# Patient Record
Sex: FEMALE | Race: WHITE | Employment: FULL TIME | URBAN - METROPOLITAN AREA
[De-identification: names, ages, dates, MRNs, and addresses within clinical notes are randomized per-mention and may not be internally consistent; named-entity substitution may affect disease eponyms.]

---

## 2019-06-15 NOTE — ED PROVIDER NOTES
Patient Seen in: [facility] [place]    History   Patient presents with:  Abdomen/Flank Pain (GI/)    Stated Complaint: LOWER ABDOMINAL PAIN X 1 WK    HPI    Patient a 27-year-old female who states last week she has had lower abdominal meningismus. LUNGS: Lungs clear to auscultation bilaterally. CARDIOVASCULAR: + S1-S2, regular rate and rhythm, no murmurs. BACK: No CVA tenderness, no midline bony tenderness. ABDOMEN: + Bowel sounds, soft, mild tenderness suprapubic, nondistended.   No showed no appendicitis. Patient denies feeling constipated. Patient may use stool softener as discussed. Recommend Tylenol ibuprofen as needed. Patient tenderness on pelvic exam will be referred to OB/GYN for further evaluation.   Patient return if righ

## 2019-06-15 NOTE — ED INITIAL ASSESSMENT (HPI)
Pt has had severe abdominal pain and cramping sonia with intercourse. No vomiting, no fever no chills, only 1 bout of diarrhea.

## 2019-06-16 NOTE — ED NOTES
Called to patient, message left to voicemail to call [facility] back.   (this is to notify of final GC/chlamydia results.)

## 2019-06-19 NOTE — ED NOTES
Called the patient's phone number and left message to call back if she has any questions regarding her visit.

## 2019-12-27 NOTE — PROGRESS NOTES
CHIEF COMPLAINT:   Patient presents with:  Sore Throat: sinus congestiion, both ear pain x5days      HPI:   Kirk Triana is a 34year old female who presents for some on-going severe sore throat/URI symptoms.   Reports she has essentially been sick about GENERAL: well developed, well nourished, and in no apparent distress  SKIN: no rashes, no suspicious lesions  HEAD: atraumatic, normocephalic.  +mild tenderness on palpation of maxillary and frontal sinuses.   EYES: conjunctiva clear, EOM intact  EARS: +Jaylan The sinuses are air-filled spaces within the bones of the face. They connect to the inside of the nose. Sinusitis is an inflammation of the tissue that lines the sinuses. Sinusitis can occur during a cold.  It can also happen due to allergies to pollens and · Do not use nasal rinses or irrigation during an acute sinus infection, unless your healthcare provider tells you to. Rinsing may spread the infection to other areas in your sinuses.   · Use acetaminophen or ibuprofen to control pain, unless another pain m Sore throats happen for many reasons, such as colds, allergies, and infections caused by viruses or bacteria. In any case, your throat becomes red and sore.  Your goal for self-care is to reduce your discomfort while giving your throat a chance to heal.  Mo · White spots on the throat  · Great difficulty swallowing  · Trouble breathing  · A skin rash  · Recent exposure to someone else with strep bacteria  · Severe hoarseness and swollen glands in the neck or jaw  Date Last Reviewed: 8/1/2016 © 2000-2019 The

## 2019-12-27 NOTE — PATIENT INSTRUCTIONS
Sinusitis (Antibiotic Treatment)    The sinuses are air-filled spaces within the bones of the face. They connect to the inside of the nose. Sinusitis is an inflammation of the tissue that lines the sinuses. Sinusitis can occur during a cold.  It can also · Do not use nasal rinses or irrigation during an acute sinus infection, unless your healthcare provider tells you to. Rinsing may spread the infection to other areas in your sinuses.   · Use acetaminophen or ibuprofen to control pain, unless another pain m Sore throats happen for many reasons, such as colds, allergies, and infections caused by viruses or bacteria. In any case, your throat becomes red and sore.  Your goal for self-care is to reduce your discomfort while giving your throat a chance to heal.  Mo · White spots on the throat  · Great difficulty swallowing  · Trouble breathing  · A skin rash  · Recent exposure to someone else with strep bacteria  · Severe hoarseness and swollen glands in the neck or jaw  Date Last Reviewed: 8/1/2016 © 2000-2019 The

## 2020-01-23 NOTE — PATIENT INSTRUCTIONS
The Flu (Influenza)     The virus that causes the flu spreads through the air in droplets when someone who has the flu coughs, sneezes, laughs, or talks. The flu (influenza) is an infection that affects your respiratory tract.  This tract is made up of The flu usually gets better after 7 days or so. In some cases, your healthcare provider may prescribe an antiviral medicine. This may help you get well a little sooner.  For the medicine to help, you need to take it as soon as possible (ideally within 48 ho · One of the best ways to prevent the flu is to get a flu vaccine each year. The CDC recommends that all people 10months of age and older get a flu vaccine every year. The virus that causes the flu changes from year to year.  For that reason, healthcare pro · Rub your hands together briskly, cleaning the backs of your hands, the palms, between your fingers, and up the wrists. · Rub until the gel is gone and your hands are completely dry.   Preventing the flu in healthcare settings  The flu is a special concer Symptoms of the flu may be mild or severe. They can include extreme tiredness (wanting to stay in bed all day), chills, fevers, muscle aches, soreness with eye movement, headache, and a dry, hacking cough.   Home care  Follow these guidelines when caring fo · Severe weakness or dizziness  · You get a new fever or cough after getting better for a few days  Date Last Reviewed: 1/1/2017  © 2649-8728 The [employer]. [address], [address] [place] [address]. All rights reserved.  This information is not

## 2020-01-23 NOTE — PROGRESS NOTES
Patient presents with:  Fever: 101.9-100.5 X 1-2 days  Flu: Chills, bodyaches, bilateral ear pain, feeling nausea, headahce, sinus pressure, post nasal drip, dry cough    Diarrhea: X Yesterday       HPI:   Sachin Bhatti is a 34year old female who p auscultation  CARDIO: RRR without murmur  GI: good BS's,no masses, HSM or tenderness    ASSESSMENT AND PLAN:   Hadley Schumacher is a 34year old female who presents with influenza. Rest, increase fluids,Tylenol prn.     Requested Prescriptions     Signed

## 2020-02-22 NOTE — PROGRESS NOTES
Patient presents with:  Diarrhea: Happens a couple times a week for a while and has noticed some blood   Fall: She had a fall about a week ago, and has back pain from it. Referral      HPI:    Diarrhea  Patient complains of diarrhea.  Symptoms have been pr transcribed by Technologist)  Patient states she has pelvic cramps and feels pelvic pressure, more so during intercourse.          FINDINGS:                UTERUS:  8.00 cm x 3.56 cm x 4.36 cm    Endometrium Thickness:  0.9 cm    The uterus appears normal i characterized further due to the small size. ADRENALS:  Not enlarged. AORTA/VASCULAR:  Smooth tapering. RETROPERITONEUM:  No adenopathy. BOWEL/MESENTERY:  Normal bowel caliber. There is a large amount of stool scattered throughout the colon.   No colon Negative for dysuria, hematuria and difficulty urinating. Musculoskeletal: Negative for myalgias, back pain, joint swelling, arthralgias and gait problem. Skin: Negative for color change and rash.    Neurological: Negative for dizziness, syncope, weakne 08/06/2007  10/08/2007      MMR                   03/05/1992 12/04/1995      Meningococcal-Menactra                          07/26/2005      OPV                   02/05/1991 04/02/1991 06/25/1992 12/04/1995      TD Sulfate  (90 Base) MCG/ACT Inhalation Aero Soln; Inhale 2 puffs into the lungs every 4 (four) hours as needed for Wheezing or Shortness of Breath (cough). Dispense: 1 Inhaler; Refill: 2  - COMPLETE PFT; Future    4.  Laboratory exam ordered as part

## 2020-02-26 NOTE — PATIENT INSTRUCTIONS
Treating Diarrhea    Diarrhea happens when you have loose, watery, or frequent bowel movements. It is a common problem with many causes. Most cases of diarrhea clear up on their own. But certain cases may need treatment.  Be sure to see your healthcare pr © 5288-1739 The [employer]. [address] [facility] [address] [place]. All rights reserved. This information is not intended as a substitute for professional medical care. Always follow your healthcare professional's instructions.         Francesco Blair · Over-the-counter nausea and diarrhea medicines are generally OK unless you experience fever or blood stool. Check with your doctor first in those circumstances.   · You may use acetaminophen or NSAID medicines like ibuprofen or naproxen to reduce pain and · Keep in mind that liquids are more important than food right now. · Drink only small amounts of liquids at a time. · Don’t force yourself to eat, especially if you are having cramping, vomiting, or diarrhea.  Don’t eat large amounts at a time, even if y When to seek medical advice  Call your healthcare provider right away if any of these occur:  · Abdominal pain that gets worse  · Constant lower right abdominal pain  · Continued vomiting and inability to keep liquids down  · Diarrhea more than 5 times a d · Infection caused by germs such as bacteria or parasites  · Food allergies, such as milk allergy in young children  · Medicines  · Inflammation of the GI tract (gastritis or esophagitis)  · Colitis (Crohn's disease or ulcerative colitis)  · Cancer (tumors · Upper GI series. X-rays of the upper part of your GI tract taken from inside your body. · Enteroscopy. This sends a flexible tube or a small, swallowed capsule camera into your small intestine.   When to call your healthcare provider  Call your healthcar

## 2020-02-26 NOTE — TELEPHONE ENCOUNTER
From: Alanna Hassan  To: Citlaly Kang DO  Sent: 2/26/2020 1:35 PM CST  Subject: Visit Follow-up Question    Received the message below, wondering how I could go about coming in prior to get the other tests done?          “Dr. Jeffry Chandra reviewed yo

## 2020-02-29 NOTE — PATIENT INSTRUCTIONS
Prevention Guidelines, Women Ages 25 to 44  Screening tests and vaccines are an important part of managing your health. A screening test is done to find possible disorders or diseases in people who don't have any symptoms.  The goal is to find a disease e Type 2 diabetes, prediabetes All women diagnosed with gestational diabetes Lifelong testing every 3 years   Type 2 diabetes All women with prediabetes Every year   Gonorrhea Sexually active women at increased risk for infection At routine exams   Hepatitis Measles, mumps, rubella (MMR) All women in this age group who have no record of these infections or vaccines 1 or 2 doses   Meningococcal Women at increased risk for infection should talk with their healthcare provider 1 or more doses   Pneumococcal conjug © 7890-7007 The [employer] [address]. [address] [facility] [place]. All rights reserved. This information is not intended as a substitute for professional medical care. Always follow your healthcare professional's instructions.

## 2020-02-29 NOTE — PROGRESS NOTES
Patient presents with:  Physical: Routine annual physical with blood work completed. HPI:    PMHx:   Patient has an appt with GI for chornic diarrhea and constiaption. Has not seen a GI before. No blood in stool. Drinking alcohol causes pain as well. Lateral meniscal tear     Internal derangement of right knee     chondroplasty & removal of med plica-rt knee-global thru 2/77/27     Plica syndrome     S/P right knee arthroscopy     Chondromalacia of right knee     Primary osteoarthritis of right knee Varicella Deferred (Had Chicken Pox)                          03/01/1997    Pended                  Date(s) Pended    TDAP                  02/29/2020        Physical Exam  /66   Pulse 70   Resp 14   Ht 63\"   Wt 105 lb (47.6 kg)   LMP 02/22/2020 F/u with GI. Possible IBS vs malabsorption. She has had some weight loss.      7. Need for Tdap vaccination  Counseled on risks and benefits of tdap vaccine   - TETANUS, DIPHTHERIA TOXOIDS AND ACELLULAR PERTUSIS VACCINE (TDAP), >7 YEARS, IM USE    Orders Pl Cervical cancer Women ages 24 and older Women between ages 24 and 34 should have a Pap test every 3 years; women between ages 27 and 72 are advised to have a Pap test plus an HPV test every 5 years   Chlamydia Sexually active women ages 22 and younger, and Hepatitis B Women at increased risk for infection should talk with their healthcare provider 3 doses over 6 months; second dose should be given 1 month after the first dose; the third dose should be given at least 2 months after the second dose and at leas Sexually transmitted infection prevention Women who are sexually active At routine exams   Skin cancer Prevention of skin cancer in fair-skinned adults At routine exams   Use of tobacco and the health effects it can cause All women in this age group Every

## 2020-03-03 PROBLEM — K52.9 CHRONIC DIARRHEA: Status: ACTIVE | Noted: 2020-03-03

## 2020-03-20 NOTE — TELEPHONE ENCOUNTER
From: Sachin Bhatti  To: Anna Dumont DO  Sent: 3/20/2020 11:25 AM CDT  Subject: Non-Urgent Medical Question    I have 2 questions if should see Dr or other:  Masood Olivarez few weeks ago prior to 1st visit, I fell on my back hard.  Knocked the wind out bad

## 2020-03-20 NOTE — TELEPHONE ENCOUNTER
Pt. Called COVID-19 Hotline. Pt. Has had sore throat, fever, and dry cough for 1 week. Reports symptoms are now worsening, coughing more frequently and mild SOB. Has been having fevers ranging from 100.2-101 consistently.    No wheezing, but has been tryin

## 2020-03-21 NOTE — TELEPHONE ENCOUNTER
Sent for covid testing by [facility] RN. Called patient today to check on her, left her a vm. Will try again.

## 2020-04-28 NOTE — PROGRESS NOTES
Wendie Harris is a 34year old female. HPI:   See answers to questions above.    See e visit    Current Outpatient Medications   Medication Sig Dispense Refill   • Albuterol Sulfate  (90 Base) MCG/ACT Inhalation Aero Soln Inhale 2 puffs into

## 2021-06-15 NOTE — TELEPHONE ENCOUNTER
Pt states she has pain in her RLQ, near the \"crease\" between her thigh and torso. Pt states it started a few days ago, denies swelling, redness, or bulge.  Pt denies fever and vomiting, pt does feel nauseated and does not have diarrhea but is c/o [employer]

## 2021-06-15 NOTE — TELEPHONE ENCOUNTER
Pt states that she has had pain, which today is excruciating today, in her groin area. She has taken ibuprofen, used ice and a hot bath. Has not had this pain before. Pt has had an ovarian cyst burst but doesn't seem like the same.   Patient has appt for t

## 2021-06-16 NOTE — PROGRESS NOTES
Patient presents with:  Groin Pain      HPI:     Patient has groin pain right lower side. Has hx of ovarian cysts. Pain has been going on for 5 days. It got worse day one but now it is better. Denies urinary symptoms.  Denies blood in urine, flank pain, Vaping Use: Never used    Alcohol use: Not Currently      Comment: occ/ rare     Drug use: No      Current Outpatient Medications   Medication Sig Dispense Refill   • naproxen 500 MG Oral Tab Take 1 tablet (500 mg total) by mouth 2 (two) times daily with m and moist.   Eyes: Conjunctivae and EOM are normal. PERRLA. No scleral icterus. Neck: Normal range of motion. Neck supple. Normal carotid pulses and no JVD present. No edema present. No mass and no thyromegaly present.    Cardiovascular: Normal rate, regu naproxen 500 MG Oral Tab 20 tablet 0     Sig: Take 1 tablet (500 mg total) by mouth 2 (two) times daily with meals for 10 days.    • cyclobenzaprine 10 MG Oral Tab 15 tablet 0     Sig: Take 1 tablet (10 mg total) by mouth nightly as needed for Muscle spasms

## 2021-06-17 NOTE — PATIENT INSTRUCTIONS
Groin Strain (Adult)  A groin strain is a stretching or partial tearing of the muscle in the lower belly (abdomen) or upper thigh. This may happen because of too much coughing, heavy lifting, or active sports.  The pain may last for several days or weeks, 2273-1863 The [facility]. All rights reserved. This information is not intended as a substitute for professional medical care. Always follow your healthcare professional's instructions.

## 2021-09-02 NOTE — PATIENT INSTRUCTIONS
Follow up with your primary care doctor for further evaluation of your ongoing symptoms of shortness of breath. Go to the ER For any new or worsening symptoms.          Shortness of Breath (Dyspnea)  Shortness of breath is the feeling that you can't catch healthcare provider, or as advised. If tests were done, you will be told if your treatment needs to be changed. You can call as directed for the results. If an X-ray was taken, a specialist will review it.  You will be notified of any new findings that ma who has been in close contact with someone who has COVID-19 should quarantine at home for 14 days from the time of exposure and follow the below recommendations.   If you test positive for COVID-19, you should notify your family and friends with whom you ha small possibility of spreading the virus. 10 Ways to Manage Your Health at Home      1. Stay home from work, school, and away from other public places. If you must go out, avoid using any kind of public transportation, ridesharing, or taxis.    2. [place] care provider with any questions.     Home Isolation  If you have tested positive for COVID-19, you should remain under home isolation precautions following the below guidelines:  • At least 24 hours have passed since recovery defined as resolution of fever virus. The antibodies in plasma can be used as a treatment for patients in our community who are most severely affected by the virus. How can I donate convalescent plasma? The process for donating plasma is very similar to donating blood.  Steve Lowea you are not feeling well 4 or more weeks after being diagnosed with COVID-19.   Patients with Post-COVID conditions may experience one or more of the following symptoms:    Persistent severe fatigue Brain fog or trouble concentrating   Headaches Sleeping di

## 2021-09-02 NOTE — PROGRESS NOTES
CHIEF COMPLAINT:   Patient presents with:  Sore Throat      HPI:   Gypsy Mckee is a 27year old female who presents for multiple complaints. Reports dry scratchy throat, chest feels tight/wheezy, and cough x 1 week.  No sinus congestion, rhinitis o Never used    Alcohol use: Not Currently      Comment: occ/ rare     Drug use: No        REVIEW OF SYSTEMS:   GENERAL: normal appetite  SKIN: no rashes or abnormal skin lesions  HEENT: See HPI  LUNGS: See HPI  CARDIOVASCULAR: denies  Palpitations.  See HPI Requested Prescriptions     Signed Prescriptions Disp Refills   • Albuterol Sulfate HFA (PROAIR HFA) 108 (90 Base) MCG/ACT Inhalation Aero Soln 1 each 0     Sig: Inhale 2 puffs into the lungs every 6 (six) hours as needed for Wheezing.        Risks, benefit quit-smoking program or ask your healthcare provider for help. · Eat a healthy diet and get plenty of sleep. · Get regular exercise. Talk with your healthcare provider before starting to exercise, especially if you have other medical problems.   · [facility] is here to provide community members reliable answers to any questions they may have. Please review the entirety of this informational document.   It includes information related to exposure, pending tests, positive results, aftercare, and plasma and contact your local public health authority or healthcare provider. • Wear a mask, stay at least 6 feet from others, wash your hands, avoid crowds, and take other steps to prevent the spread of COVID-19.   CDC continues to endorse quarantine for 14 days contact your health care provider before seeking further care. Process measures to keep everyone safe in this difficult time are changing frequently. Your healthcare provider can help direct you on next steps.     If you have not been exposed or are not aw testing after a positive test.  Convalescent Plasma Donation Program  Galilea Jackson, in conjunction with Taylor Kelly., is looking for patients who have recovered from COVID-19 and would be interested in donating plasma.     Convalescent plasma is [employer]  http://www.[facility].[place].gov/topics-services/diseases-and-conditions/diseases-a-z-list/coronavirus  https://www.cdc.gov/coronavirus/2019-nc (n.d.). Retrieved May 11, 2021, from [employer].  What it means to be A Coronavirus \"long-hauler\". (2021, January 28). Retrieved March 17, 2021, from https://[facility]. [facility].org/what-it-means-to

## 2021-09-28 NOTE — PROGRESS NOTES
Patient presents with:  Lump: throat lump ongoing for a month- completed abx but doesnt believe its strep. She has a lump that is growing and is not sure whats going on      HPI:  Dysphagia  Patient presents with dysphagia.  The patient describes few in num Maternal Grandmother    • Cancer Neg    • Diabetes Neg      Social History    Tobacco Use      Smoking status: Never Smoker      Smokeless tobacco: Never Used    Vaping Use      Vaping Use: Never used    Alcohol use: Not Currently      Comment: occ/ rare LMP 08/29/2021 (Exact Date)   SpO2 98%   BMI 20.19 kg/m²   Constitutional: Oriented to person, place, and time. No distress. HEENT:  Normocephalic and atraumatic.  Hearing and tympanic membranes normal.  Nose normal. Oropharynx is clear and moist.   Eyes: reasons why you may have a sore throat. Your healthcare provider will work with you to find the cause of your sore throat. He or she will also find the best treatment for you. What causes a sore throat?   Sore throats can be caused or worsened by:   · C tests your healthcare provider may perform include:   · A throat swab to check for bacteria such as streptococcus (the bacteria that causes strep throat)  · A blood test to check for mononucleosis (a viral infection)  · A chest X-ray to rule out pneumonia, If antibiotics are prescribed  Take the medicine exactly as directed. Be sure to finish your prescription even if you’re feeling better. Ask your healthcare provider or pharmacist what side effects are common and what to do about them.    Is surgery neede

## 2021-09-30 NOTE — PATIENT INSTRUCTIONS
When You Have a Sore Throat  A sore throat can be painful. There are many reasons why you may have a sore throat. Your healthcare provider will work with you to find the cause of your sore throat. He or she will also find the best treatment for you. swelling in the neck, and may listen to your chest.   Possible tests  Other tests your healthcare provider may perform include:   · A throat swab to check for bacteria such as streptococcus (the bacteria that causes strep throat)  · A blood test to check f will prescribe antibiotics only if he or she thinks they are likely to help. If antibiotics are prescribed  Take the medicine exactly as directed. Be sure to finish your prescription even if you’re feeling better.  Ask your healthcare provider or pharmaci

## 2022-02-17 NOTE — TELEPHONE ENCOUNTER
From: Thomas Mclain  To: Milo Diane MD  Sent: 2/17/2022 3:37 PM CST  Subject: Question regarding HPV HIGH RISK , THIN PREP    Do the test results mean nothing was detected? Narrative is confusing. Thank you!

## 2023-03-02 ENCOUNTER — PATIENT MESSAGE (OUTPATIENT)
Dept: FAMILY MEDICINE CLINIC | Facility: CLINIC | Age: 33
End: 2023-03-02

## 2023-03-02 DIAGNOSIS — Z00.00 LABORATORY EXAMINATION ORDERED AS PART OF A ROUTINE GENERAL MEDICAL EXAMINATION: Primary | ICD-10-CM

## 2023-03-07 NOTE — TELEPHONE ENCOUNTER
From: Carol Garcia  To: Ike Davis DO  Sent: 3/2/2023 12:59 PM CST  Subject: Blood test before annual    Hi -    I wanted to schedule my annual visit with [facility] or another Dr this month. (Earlier better). But I was hoping I could get my annual blood tests done prior, so we have them to review. Can I request an order for this? Along with the standards on it, can I ask for Evon Disease and Diabetes to be tested? My sister and cousins have both been recently diagnosed with D and want to take precautions.  I also have had a few ticks but never tested,    Thank you!!!

## 2023-03-30 ENCOUNTER — LAB ENCOUNTER (OUTPATIENT)
Dept: LAB | Age: 33
End: 2023-03-30
Attending: FAMILY MEDICINE
Payer: COMMERCIAL

## 2023-03-30 DIAGNOSIS — Z00.00 LABORATORY EXAMINATION ORDERED AS PART OF A ROUTINE GENERAL MEDICAL EXAMINATION: ICD-10-CM

## 2023-03-30 LAB
ALBUMIN SERPL-MCNC: 4.1 G/DL (ref 3.4–5)
ALBUMIN/GLOB SERPL: 1.2 {RATIO} (ref 1–2)
ALP LIVER SERPL-CCNC: 55 U/L
ALT SERPL-CCNC: 19 U/L
ANION GAP SERPL CALC-SCNC: 5 MMOL/L (ref 0–18)
AST SERPL-CCNC: 14 U/L (ref 15–37)
BASOPHILS # BLD AUTO: 0.05 X10(3) UL (ref 0–0.2)
BASOPHILS NFR BLD AUTO: 0.8 %
BILIRUB SERPL-MCNC: 0.3 MG/DL (ref 0.1–2)
BUN BLD-MCNC: 14 MG/DL (ref 7–18)
CALCIUM BLD-MCNC: 9.1 MG/DL (ref 8.5–10.1)
CHLORIDE SERPL-SCNC: 106 MMOL/L (ref 98–112)
CHOLEST SERPL-MCNC: 181 MG/DL (ref ?–200)
CO2 SERPL-SCNC: 25 MMOL/L (ref 21–32)
CREAT BLD-MCNC: 0.91 MG/DL
EOSINOPHIL # BLD AUTO: 0.1 X10(3) UL (ref 0–0.7)
EOSINOPHIL NFR BLD AUTO: 1.7 %
ERYTHROCYTE [DISTWIDTH] IN BLOOD BY AUTOMATED COUNT: 12.2 %
FASTING PATIENT LIPID ANSWER: YES
FASTING STATUS PATIENT QL REPORTED: YES
GFR SERPLBLD BASED ON 1.73 SQ M-ARVRAT: 86 ML/MIN/1.73M2 (ref 60–?)
GLOBULIN PLAS-MCNC: 3.5 G/DL (ref 2.8–4.4)
GLUCOSE BLD-MCNC: 95 MG/DL (ref 70–99)
HCT VFR BLD AUTO: 43.7 %
HDLC SERPL-MCNC: 54 MG/DL (ref 40–59)
HGB BLD-MCNC: 14.1 G/DL
IMM GRANULOCYTES # BLD AUTO: 0.01 X10(3) UL (ref 0–1)
IMM GRANULOCYTES NFR BLD: 0.2 %
LDLC SERPL CALC-MCNC: 113 MG/DL (ref ?–100)
LYMPHOCYTES # BLD AUTO: 1.65 X10(3) UL (ref 1–4)
LYMPHOCYTES NFR BLD AUTO: 27.9 %
MCH RBC QN AUTO: 31.7 PG (ref 26–34)
MCHC RBC AUTO-ENTMCNC: 32.3 G/DL (ref 31–37)
MCV RBC AUTO: 98.2 FL
MONOCYTES # BLD AUTO: 0.64 X10(3) UL (ref 0.1–1)
MONOCYTES NFR BLD AUTO: 10.8 %
NEUTROPHILS # BLD AUTO: 3.47 X10 (3) UL (ref 1.5–7.7)
NEUTROPHILS # BLD AUTO: 3.47 X10(3) UL (ref 1.5–7.7)
NEUTROPHILS NFR BLD AUTO: 58.6 %
NONHDLC SERPL-MCNC: 127 MG/DL (ref ?–130)
OSMOLALITY SERPL CALC.SUM OF ELEC: 282 MOSM/KG (ref 275–295)
PLATELET # BLD AUTO: 229 10(3)UL (ref 150–450)
POTASSIUM SERPL-SCNC: 3.9 MMOL/L (ref 3.5–5.1)
PROT SERPL-MCNC: 7.6 G/DL (ref 6.4–8.2)
RBC # BLD AUTO: 4.45 X10(6)UL
SODIUM SERPL-SCNC: 136 MMOL/L (ref 136–145)
T4 FREE SERPL-MCNC: 0.9 NG/DL (ref 0.8–1.7)
TRIGL SERPL-MCNC: 77 MG/DL (ref 30–149)
TSI SER-ACNC: 6.99 MIU/ML (ref 0.36–3.74)
VLDLC SERPL CALC-MCNC: 13 MG/DL (ref 0–30)
WBC # BLD AUTO: 5.9 X10(3) UL (ref 4–11)

## 2023-03-30 PROCEDURE — 84439 ASSAY OF FREE THYROXINE: CPT

## 2023-03-30 PROCEDURE — 80053 COMPREHEN METABOLIC PANEL: CPT

## 2023-03-30 PROCEDURE — 85025 COMPLETE CBC W/AUTO DIFF WBC: CPT

## 2023-03-30 PROCEDURE — 80061 LIPID PANEL: CPT

## 2023-03-30 PROCEDURE — 84443 ASSAY THYROID STIM HORMONE: CPT

## 2023-03-30 PROCEDURE — 36415 COLL VENOUS BLD VENIPUNCTURE: CPT

## 2023-04-04 ENCOUNTER — OFFICE VISIT (OUTPATIENT)
Dept: FAMILY MEDICINE CLINIC | Facility: CLINIC | Age: 33
End: 2023-04-04
Payer: COMMERCIAL

## 2023-04-04 VITALS
HEART RATE: 89 BPM | HEIGHT: 63 IN | SYSTOLIC BLOOD PRESSURE: 100 MMHG | BODY MASS INDEX: 21.68 KG/M2 | RESPIRATION RATE: 16 BRPM | OXYGEN SATURATION: 98 % | TEMPERATURE: 98 F | DIASTOLIC BLOOD PRESSURE: 60 MMHG | WEIGHT: 122.38 LBS

## 2023-04-04 DIAGNOSIS — E07.89 THYROID PAIN: ICD-10-CM

## 2023-04-04 DIAGNOSIS — E04.9 ENLARGED THYROID: ICD-10-CM

## 2023-04-04 DIAGNOSIS — E03.9 ACQUIRED HYPOTHYROIDISM: ICD-10-CM

## 2023-04-04 DIAGNOSIS — Z11.3 SCREENING FOR STD (SEXUALLY TRANSMITTED DISEASE): ICD-10-CM

## 2023-04-04 DIAGNOSIS — Z00.00 WELL ADULT EXAM: Primary | ICD-10-CM

## 2023-04-04 DIAGNOSIS — R79.89 HIGH SERUM LOW DENSITY LIPOPROTEIN (LDL) CHOLESTEROL: ICD-10-CM

## 2023-04-04 PROCEDURE — 3078F DIAST BP <80 MM HG: CPT | Performed by: FAMILY MEDICINE

## 2023-04-04 PROCEDURE — 3074F SYST BP LT 130 MM HG: CPT | Performed by: FAMILY MEDICINE

## 2023-04-04 PROCEDURE — 90471 IMMUNIZATION ADMIN: CPT | Performed by: FAMILY MEDICINE

## 2023-04-04 PROCEDURE — 3008F BODY MASS INDEX DOCD: CPT | Performed by: FAMILY MEDICINE

## 2023-04-04 PROCEDURE — 99395 PREV VISIT EST AGE 18-39: CPT | Performed by: FAMILY MEDICINE

## 2023-04-04 PROCEDURE — 90677 PCV20 VACCINE IM: CPT | Performed by: FAMILY MEDICINE

## 2023-04-04 RX ORDER — LEVOTHYROXINE SODIUM 0.03 MG/1
25 TABLET ORAL
Qty: 60 TABLET | Refills: 0 | Status: SHIPPED | OUTPATIENT
Start: 2023-04-04

## 2023-06-01 DIAGNOSIS — E04.9 ENLARGED THYROID: ICD-10-CM

## 2023-06-01 DIAGNOSIS — E03.9 ACQUIRED HYPOTHYROIDISM: ICD-10-CM

## 2023-06-01 NOTE — TELEPHONE ENCOUNTER
Requested Renewals     levothyroxine 25 MCG Oral Tab          Possible duplicate: Yahirver to review recent actions on this medication    Sig: Take 1 tablet (25 mcg total) by mouth before breakfast.    Disp: 60 tablet    Refills: 0    Start: 6/1/2023    Class: Normal    For: Acquired hypothyroidism, Enlarged thyroid    Last ordered: 1 month ago by Donald Berkowitz DO     Patient comment: Out by saturday    Thyroid Supplements Protocol Gdxjzz0706/01/2023 10:25 AM   Protocol Details TSH value between 0.350 and 5.500 IU/ml    TSH test in past 12 months    Appointment in past 12 or next 3 months         duplicate

## 2023-06-01 NOTE — TELEPHONE ENCOUNTER
Requested Renewals     Name from pharmacy: LEVOTHYROXINE 0.025MG (25MCG) TAB         Will file in chart as: LEVOTHYROXINE 25 MCG Oral Tab     Possible duplicate: Hover to review recent actions on this medication    Sig: TAKE 1 TABLET(25 MCG) BY MOUTH BEFORE BREAKFAST    Disp: 60 tablet    Refills: 0 (Pharmacy requested: Not specified)    Start: 6/1/2023    Class: Normal    Non-formulary For: Acquired hypothyroidism, Enlarged thyroid    Last ordered: 1 month ago by Katie Cm DO Last refill: 4/30/2023    Rx #: [id number]    Thyroid Supplements Protocol Bhwxwr4806/01/2023 10:21 AM   Protocol Details TSH value between 0.350 and 5.500 IU/ml    TSH test in past 12 months    Appointment in past 12 or next 3 months             No future appointments. LOV: 4/4/23 for physical exam    Thyroid:    Lab Results   Component Value Date    TSH 6.990 (H) 03/30/2023    TSH 5.210 (H) 09/28/2021    TSH 4.880 (H) 02/22/2020    T4F 0.9 03/30/2023    T4F 1.0 09/28/2021    T4F 1.0 02/22/2020     -medication pended for review.

## 2023-06-02 RX ORDER — LEVOTHYROXINE SODIUM 0.03 MG/1
TABLET ORAL
Qty: 60 TABLET | Refills: 0 | Status: SHIPPED | OUTPATIENT
Start: 2023-06-02

## 2023-06-02 RX ORDER — LEVOTHYROXINE SODIUM 0.03 MG/1
25 TABLET ORAL
Qty: 60 TABLET | Refills: 0 | Status: SHIPPED | OUTPATIENT
Start: 2023-06-02

## 2023-06-22 ENCOUNTER — LAB ENCOUNTER (OUTPATIENT)
Dept: LAB | Age: 33
End: 2023-06-22
Attending: FAMILY MEDICINE
Payer: COMMERCIAL

## 2023-06-22 DIAGNOSIS — E04.9 ENLARGED THYROID: ICD-10-CM

## 2023-06-22 DIAGNOSIS — E03.9 ACQUIRED HYPOTHYROIDISM: ICD-10-CM

## 2023-06-22 DIAGNOSIS — Z11.3 SCREENING FOR STD (SEXUALLY TRANSMITTED DISEASE): ICD-10-CM

## 2023-06-22 LAB
T PALLIDUM AB SER QL IA: NONREACTIVE
T4 FREE SERPL-MCNC: 1.1 NG/DL (ref 0.8–1.7)
TSI SER-ACNC: 6.34 MIU/ML (ref 0.36–3.74)

## 2023-06-22 PROCEDURE — 84439 ASSAY OF FREE THYROXINE: CPT

## 2023-06-22 PROCEDURE — 87491 CHLMYD TRACH DNA AMP PROBE: CPT

## 2023-06-22 PROCEDURE — 84443 ASSAY THYROID STIM HORMONE: CPT

## 2023-06-22 PROCEDURE — 87591 N.GONORRHOEAE DNA AMP PROB: CPT

## 2023-06-22 PROCEDURE — 87389 HIV-1 AG W/HIV-1&-2 AB AG IA: CPT

## 2023-06-22 PROCEDURE — 86780 TREPONEMA PALLIDUM: CPT

## 2023-06-22 PROCEDURE — 36415 COLL VENOUS BLD VENIPUNCTURE: CPT

## 2023-06-23 LAB
C TRACH DNA SPEC QL NAA+PROBE: NEGATIVE
N GONORRHOEA DNA SPEC QL NAA+PROBE: NEGATIVE

## 2023-06-28 ENCOUNTER — TELEPHONE (OUTPATIENT)
Dept: FAMILY MEDICINE CLINIC | Facility: CLINIC | Age: 33
End: 2023-06-28

## 2023-07-17 ENCOUNTER — OFFICE VISIT (OUTPATIENT)
Dept: FAMILY MEDICINE CLINIC | Facility: CLINIC | Age: 33
End: 2023-07-17
Payer: COMMERCIAL

## 2023-07-17 VITALS
SYSTOLIC BLOOD PRESSURE: 102 MMHG | HEIGHT: 63 IN | DIASTOLIC BLOOD PRESSURE: 64 MMHG | TEMPERATURE: 99 F | HEART RATE: 84 BPM | OXYGEN SATURATION: 99 % | RESPIRATION RATE: 18 BRPM | WEIGHT: 126 LBS | BODY MASS INDEX: 22.32 KG/M2

## 2023-07-17 DIAGNOSIS — J39.2: ICD-10-CM

## 2023-07-17 DIAGNOSIS — E03.8 HYPOTHYROIDISM DUE TO HASHIMOTO'S THYROIDITIS: Primary | ICD-10-CM

## 2023-07-17 DIAGNOSIS — R22.1 NECK SWELLING: ICD-10-CM

## 2023-07-17 DIAGNOSIS — E06.3 HYPOTHYROIDISM DUE TO HASHIMOTO'S THYROIDITIS: Primary | ICD-10-CM

## 2023-07-17 DIAGNOSIS — M54.2 TENDERNESS OF NECK: ICD-10-CM

## 2023-07-17 PROCEDURE — 3078F DIAST BP <80 MM HG: CPT | Performed by: FAMILY MEDICINE

## 2023-07-17 PROCEDURE — 3008F BODY MASS INDEX DOCD: CPT | Performed by: FAMILY MEDICINE

## 2023-07-17 PROCEDURE — 99213 OFFICE O/P EST LOW 20 MIN: CPT | Performed by: FAMILY MEDICINE

## 2023-07-17 PROCEDURE — 3074F SYST BP LT 130 MM HG: CPT | Performed by: FAMILY MEDICINE

## 2023-07-17 RX ORDER — NYSTATIN 500000 [USP'U]/1
TABLET, COATED ORAL
COMMUNITY
Start: 2023-06-07

## 2023-08-08 ENCOUNTER — PATIENT MESSAGE (OUTPATIENT)
Dept: FAMILY MEDICINE CLINIC | Facility: CLINIC | Age: 33
End: 2023-08-08

## 2023-08-09 ENCOUNTER — TELEPHONE (OUTPATIENT)
Dept: FAMILY MEDICINE CLINIC | Facility: CLINIC | Age: 33
End: 2023-08-09

## 2023-08-09 NOTE — TELEPHONE ENCOUNTER
From: Susan Aparicio  To: Adolph White, DO  Sent: 8/8/2023 5:43 PM CDT  Subject: Urgent- denial for scan this Friday    I just got a voicemail, and then off the phone with the company who is contracted through [employer] on denying or approving procedures. They denied my scan for this Friday. But they said if my doctor calls their doctors and spoke on the phone, they will approve it. They said it had to be a certain size, or fixed in place. Which I responded, a thyroid is fixed in place, and does not move. Which is the mass in concern. And she said to have the doctor call speak with their doctor directly on the phone. Vs sending paper.     Can we please do this? mind [employer] and the third contract company denying things so easily now ):

## 2023-08-09 NOTE — TELEPHONE ENCOUNTER
Lorraine Das with [facility] calling to speak to someone about the order for CT soft tissue of neck. Chyna Bishop does pre-certification for [facility] needs more information to approve this test. Pt is scheduled for the CT 23. Chyna Bishop has original request but it does not contain enough information. Please contact [facility] [phone].  Ref Pt  and member ID - [id number]

## 2023-08-10 ENCOUNTER — TELEPHONE (OUTPATIENT)
Dept: FAMILY MEDICINE CLINIC | Facility: CLINIC | Age: 33
End: 2023-08-10

## 2023-08-10 NOTE — TELEPHONE ENCOUNTER
Patient states she is scheduled tomorrow at 8:30 for CT neck and her insurance has denied it, denial #[id number]. Priya Smith tells her with a one minute phone call from the provider they will approve it. Bill # [phone] and Lidya # [phone]. She does not want to cancel her appt tomorrow, please advise.

## 2023-08-10 NOTE — TELEPHONE ENCOUNTER
Pt. calling back asking about authorization of CT of Neck. . Per Dr. Zain Nuñez pt.does not meet insurance criteria for test .  Pt. Very upset and does not understanding why she does not meet criteria . Hung up on me.

## 2023-08-10 NOTE — TELEPHONE ENCOUNTER
Good Afternoon,  Please be advise that the CT SOFT TISSUE OF NECK(CONTRAST ONLY) (CPT=70491) has been Denied by the Patient HealthCare. According to [facility] Dr. Santosh Gonsales can call and initiate an Appeal to see if the Denial can be overturned. Patient is due to come in tomorrow morning and can someone please address this matter. Case# [id number]      [facility]#[phone]        Thanks,   Guy Khan    Denial Reason:      [employer] has been authorized by the Health [facility] to review certain services. We received your request for coverage verification and approval for the following service(s) for   the member referenced above. Procedure Description Units   Requested  Units   Denied  56736 Computed Tomography (CT), a special kind of   picture of your neck with contrast (dye)  1 1  Coverage for this service has been denied for the following reason(s):   Based on [facility] Neck Imaging Guidelines Section(s): Neck Mass/Swelling/Adenopathy   (NECK 5.1), we cannot approve this request. Your healthcare provider told us that there is a   concern related to your neck. The request cannot be approved because: It must be needed for one of the following types of neck mass (growth). -At least 1.5 centimeters in size.   -Firm or fixed in place.   -Suspected abscess (a painful collection of pus). -Other neck mass listed in this guideline. You have immediate appeal rights.  Please see attached information to learn how to initiate an appeal.

## 2023-08-10 NOTE — TELEPHONE ENCOUNTER
Patient's insurance denied the CT of thyroid denial # [id number]. Patient is scheduled for tomorrow and wants to get test done. Jono Vaughn tells her with a one minute phone call from the provider they will approve it. Bill # [phone] and Lidya # [phone].  She does not want to cancel her appt tomorrow,   Will only allow Doctor or PA to call

## 2023-08-10 NOTE — TELEPHONE ENCOUNTER
Please cancel her appt.  She does not meet insurance criteria for this test. It's likely her thyroid that was shown to be

## 2023-08-11 ENCOUNTER — PATIENT MESSAGE (OUTPATIENT)
Dept: FAMILY MEDICINE CLINIC | Facility: CLINIC | Age: 33
End: 2023-08-11

## 2023-08-11 ENCOUNTER — TELEPHONE (OUTPATIENT)
Dept: FAMILY MEDICINE CLINIC | Facility: CLINIC | Age: 33
End: 2023-08-11

## 2023-08-11 NOTE — TELEPHONE ENCOUNTER
Lidya needs the call if we are still trying to authorize CT neck, call Bella Marshall @[phone] REF# [phone].

## 2023-08-14 NOTE — TELEPHONE ENCOUNTER
Approved. Authorization # [id number]    Let the patient know that she can schedule the CT scan, it  has been approved after peer to peer was done      Let her know that there is a process to getting imaging approved regularly if its been denied. She has to respect this policy and patience is needed for the process. Furthermore, I think it is in the patient's best interest to see another provider in our office or another [place] office.

## 2023-08-14 NOTE — TELEPHONE ENCOUNTER
Insurance calling back. Jaqui Resendiz is the one who should be called-they decide if the CT will be approved or not. Danielle with Insurance states for the diagnosis of neck swelling, they need information in regards to the size of the mass and if it firm or fixed in place. Please call [phone].

## 2023-09-17 ENCOUNTER — PATIENT MESSAGE (OUTPATIENT)
Dept: FAMILY MEDICINE CLINIC | Facility: CLINIC | Age: 33
End: 2023-09-17

## 2023-09-18 ENCOUNTER — HOSPITAL ENCOUNTER (OUTPATIENT)
Dept: CT IMAGING | Age: 33
Discharge: HOME | End: 2023-09-18
Attending: FAMILY MEDICINE
Payer: COMMERCIAL

## 2023-09-18 DIAGNOSIS — J39.2: ICD-10-CM

## 2023-09-18 DIAGNOSIS — M54.2 TENDERNESS OF NECK: ICD-10-CM

## 2023-09-18 PROCEDURE — 70491 CT SOFT TISSUE NECK W/DYE: CPT | Performed by: FAMILY MEDICINE

## 2023-09-18 PROCEDURE — 82565 ASSAY OF CREATININE: CPT

## 2023-09-18 NOTE — TELEPHONE ENCOUNTER
From: Paulette Daniel  To: Jumana Wei  Sent: 9/17/2023 7:42 PM CDT  Subject: Ct scan with or without contrast?    I have my scan scheduled Monday 18th at 730pm. Am I to get contrast or no? I know Contact can affect thyroid hormones especially with hashimotos .

## 2023-09-19 LAB
CREAT BLD-MCNC: 0.9 MG/DL
EGFRCR SERPLBLD CKD-EPI 2021: 87 ML/MIN/1.73M2 (ref 60–?)

## 2023-10-02 ENCOUNTER — TELEPHONE (OUTPATIENT)
Dept: FAMILY MEDICINE CLINIC | Facility: CLINIC | Age: 33
End: 2023-10-02

## 2023-10-02 NOTE — TELEPHONE ENCOUNTER
- Pt is requesting refill. Declined appointment. ENT told patient to wait to do labs as they will also need her to complete labs. Disp Refills Start End    levothyroxine 50 MCG Oral Tab 30 tablet 2 6/28/2023     Sig - Route:  Take 1 tablet (50 mcg total) by mouth before breakfast. - Oral    Sent to pharmacy as: Levothyroxine Sodium 50 MCG Oral Tablet (Synthroid)    E-Prescribing Status: Receipt confirmed by pharmacy (6/28/2023  2:15 PM CDT)      [address] Trail     [facility] [address] AT [place] [address] OF  [address], [phone], [phone]

## 2023-10-03 ENCOUNTER — PATIENT MESSAGE (OUTPATIENT)
Dept: FAMILY MEDICINE CLINIC | Facility: CLINIC | Age: 33
End: 2023-10-03

## 2023-10-03 RX ORDER — LEVOTHYROXINE SODIUM 0.05 MG/1
50 TABLET ORAL
Qty: 90 TABLET | Refills: 0 | Status: SHIPPED | OUTPATIENT
Start: 2023-10-03

## 2023-10-03 NOTE — TELEPHONE ENCOUNTER
From: Ramana Speaks  To: Julio Oneill  Sent: 10/3/2023 1:42 AM CDT  Subject: Medicine refill    Please approve the refill request from Meghan Kurtz.  I am out of thyroid medication

## 2023-10-03 NOTE — TELEPHONE ENCOUNTER
Medication Quantity Refills Start End   levothyroxine 50 MCG Oral Tab 30 tablet 2 6/28/2023    Sig:   Take 1 tablet (50 mcg total) by mouth before breakfast.     Route:   Oral     Order #:   [id number]       Last OV 7/17/23  No future appointments.      Thyroid Supplements Protocol Ojizqr9309/30/2023 04:36 PM   Protocol Details TSH value between 0.350 and 5.500 IU/ml    TSH test in past 12 months    Appointment in past 12 or next 3 months        Patient was advised to repeat labs in December by ENT    RX sent

## 2023-10-04 RX ORDER — LEVOTHYROXINE SODIUM 0.05 MG/1
50 TABLET ORAL
Qty: 30 TABLET | Refills: 2 | OUTPATIENT
Start: 2023-10-04

## (undated) NOTE — Clinical Note
I had the pleasure of seeing Tran Yip for acute symptoms of sore throat and cough today, COVID test sent. However she was advised to follow up with you.  She is reporting ongoing shortness of breath and decreased activity tolerance for the past

## (undated) NOTE — LETTER
ASTHMA ACTION PLAN for Kartik Tidwell     : 1990     Date: 2023  Provider:  Louis Meza DO  Phone for doctor or clinic: [facility], [address] [facility] , [address] [place] [address],[address]  [phone]           You can use the colors of a traffic light to help learn about your asthma medicines. 1. Green - Go! % of Personal Best Peak Flow Use controller medicine. Breathing is good  No cough or wheeze  Can work and play Medicine How much to take When to take it    Patient does not take any maintenance medications      2. Yellow - Caution. 50-79% Personal Best Peak  Flow. Use reliever medicine to keep an asthma attack from getting bad. Cough  Wheezing  Tight Chest  Wake up at night Medicine How much to take When to take it    Albuterol inhaler,  2 puffs every four hours as needed. Additional instructions         3. Red - Stop! Danger!  <50% Personal Best Peak  Flow. Take these medications until  Get help from a doctor   Medicine not helping  Breathing is hard and fast  Nose opens wide  Can't walk  Ribs show  Can't talk well Medicine How much to take When to take it    Call 911 or go to the nearest ER. Call your doctor. Additional Instructions If your symptoms do not improve and you cannot contact your doctor, [facility] or call 911 immediately! [x] Asthma Action Plan reviewed with patient (and caregiver if necessary) and a copy of the plan was given to the patient/caregiver. [] Asthma Action Plan reviewed with patient (and caregiver if necessary) on the phone and mailed copy to patient or submitted via [address]      Signatures:  Provider  Louis Meza DO   Patient Caretaker

## (undated) NOTE — LETTER
ASTHMA ACTION PLAN for Torey Parents     : 1990     Date: 2022  Provider:  Jesus Siegel MD  Phone for doctor or clinic: [address] [facility], [address] [facility] , [address] [place], [place] [address] [facility],[address]  [phone]           You can use the colors of a traffic light to help learn about your asthma medicines. 1. Green - Go! % of Personal Best Peak Flow Use controller medicine. Breathing is good  No cough or wheeze  Can work and play Medicine How much to take When to take it    Singulair (Montelukast) 10 mg by mouth daily        2. Yellow - Caution. 50-79% Personal Best Peak  Flow. Use reliever medicine to keep an asthma attack from getting bad. Cough  Wheezing  Tight Chest  Wake up at night Medicine How much to take When to take it    The above, plus. .. Albuterol inhaler,  2 puffs every four hours as needed. Additional instructions         3. Red - Stop! Danger!  <50% Personal Best Peak  Flow. Take these medications until  Get help from a doctor   Medicine not helping  Breathing is hard and fast  Nose opens wide  Can't walk  Ribs show  Can't talk well Medicine How much to take When to take it    Albuterol inhaler,  2 puffs every 20 minutes on the way to ER       Additional Instructions If your symptoms do not improve and you cannot contact your doctor, [facility] or call 911 immediately! [x] Asthma Action Plan reviewed with patient (and caregiver if necessary) and a copy of the plan was given to the patient/caregiver. [] Asthma Action Plan reviewed with patient (and caregiver if necessary) on the phone and mailed copy to patient or submitted via [address]      Signatures:  Provider  Jesus Siegel MD   Patient Caretaker